# Patient Record
Sex: FEMALE | Race: WHITE
[De-identification: names, ages, dates, MRNs, and addresses within clinical notes are randomized per-mention and may not be internally consistent; named-entity substitution may affect disease eponyms.]

---

## 2018-10-13 ENCOUNTER — HOSPITAL ENCOUNTER (EMERGENCY)
Dept: HOSPITAL 47 - EC | Age: 28
Discharge: HOME | End: 2018-10-13
Payer: COMMERCIAL

## 2018-10-13 VITALS — SYSTOLIC BLOOD PRESSURE: 120 MMHG | DIASTOLIC BLOOD PRESSURE: 77 MMHG | TEMPERATURE: 98.4 F | RESPIRATION RATE: 22 BRPM

## 2018-10-13 VITALS — HEART RATE: 120 BPM

## 2018-10-13 DIAGNOSIS — F17.200: ICD-10-CM

## 2018-10-13 DIAGNOSIS — J98.01: ICD-10-CM

## 2018-10-13 DIAGNOSIS — J40: Primary | ICD-10-CM

## 2018-10-13 DIAGNOSIS — Z88.0: ICD-10-CM

## 2018-10-13 PROCEDURE — 71046 X-RAY EXAM CHEST 2 VIEWS: CPT

## 2018-10-13 PROCEDURE — 94640 AIRWAY INHALATION TREATMENT: CPT

## 2018-10-13 PROCEDURE — 96372 THER/PROPH/DIAG INJ SC/IM: CPT

## 2018-10-13 PROCEDURE — 99285 EMERGENCY DEPT VISIT HI MDM: CPT

## 2018-10-13 NOTE — ED
General Adult HPI





- General


Chief complaint: Upper Respiratory Infection


Stated complaint: Cough, SOB


Time Seen by Provider: 10/13/18 07:30


Source: patient, RN notes reviewed


Mode of arrival: ambulatory


Limitations: no limitations





- History of Present Illness


Initial comments: 





This is a 27-year-old female who is a smoker and presents to the emergency 

department with a two-week history of cough and some shortness of breath.  

Patient states got worse over the last 3 days now she has some positive sputum 

production.  Patient states that she has also the chills and felt that she's 

had a fever but has not taken her temperature.  Patient denies any chest pain 

or palpitations.  Patient denies any abdominal pain patient denies nausea 

vomiting diarrhea.  Patient denies any headache patient denies any numbness 

weakness.  Patient denies any dysuria hematuria urinary frequency.  Patient 

denies any calf pain or leg swelling.





- Related Data


 Previous Rx's











 Medication  Instructions  Recorded


 


HYDROcodone/APAP 5-325MG [Norco 1 tab PO Q6HR PRN #15 tab 11/20/16





5-325]  


 


Sulfamethox-Tmp 800-160Mg [Bactrim 1 tab PO Q12HR #20 tab 11/20/16





-160 mg]  


 


Albuterol Inhaler [Ventolin Hfa 1 - 2 puff INHALATION Q6HR PRN #2 10/13/18





Inhaler] puff 


 


Azithromycin [Zithromax Tri-Kris] 500 mg PO DAILY #3 tab 10/13/18


 


predniSONE 40 mg PO DAILY #8 tab 10/13/18











 Allergies











Allergy/AdvReac Type Severity Reaction Status Date / Time


 


Penicillins Allergy  Unknown Verified 10/13/18 07:34














Review of Systems


ROS Statement: 


Those systems with pertinent positive or pertinent negative responses have been 

documented in the HPI.





ROS Other: All systems not noted in ROS Statement are negative.





Past Medical History


Past Medical History: No Reported History


Additional Past Medical History / Comment(s): anemia


History of Any Multi-Drug Resistant Organisms: None Reported


Past Surgical History: Appendectomy, Orthopedic Surgery


Past Psychological History: No Psychological Hx Reported


Smoking Status: Current every day smoker


Past Alcohol Use History: Occasional


Past Drug Use History: None Reported





General Exam





- General Exam Comments


Initial Comments: 





GENERAL:


Patient is well-developed and well-nourished.  Patient is nontoxic and well-

hydrated and is in mild distress.





ENT:


Neck is soft and supple.  No significant lymphadenopathy is noted.  Oropharynx 

is clear.  Moist mucous membranes.  Neck has full range of motion without 

eliciting any pain.  





EYES:


The sclera were anicteric and conjunctiva were pink and moist.  Extraocular 

movements were intact and pupils were equal round and reactive to light.  

Eyelids were unremarkable.





PULMONARY:


Patient has some slight very wheezing.





CARDIOVASCULAR:


There is a regular rate and rhythm without any murmurs gallops or rubs. 





ABDOMEN:


Soft and nontender with normal bowel sounds.  No palpable organomegaly was 

noted.  There is no palpable pulsatile mass.





SKIN:


Skin is clear with no lesions or rashes and otherwise unremarkable.





NEUROLOGIC:


Patient is alert and oriented x3.  Cranial nerves II through XII are grossly 

intact.  Motor and sensory are also intact.  Normal speech, volume and content.

  Symmetrical smile.  





MUSCULOSKELETAL:


Normal extremities with adequate strength and full range of motion.  No lower 

extremity swelling or edema.  No calf tenderness.





LYMPHATICS:


No significant lymphadenopathy is noted





PSYCHIATRIC:


Normal psychiatric evaluation.  


Limitations: no limitations





Course


 Vital Signs











  10/13/18 10/13/18 10/13/18





  07:31 08:13 08:21


 


Temperature 98.4 F  


 


Pulse Rate 128 H 118 H 120 H


 


Respiratory 22  





Rate   


 


Blood Pressure 120/77  


 


O2 Sat by Pulse 91 L  





Oximetry   














Medical Decision Making





- Medical Decision Making





Treatment shows no acute abnormality.





Patient received a breathing treatment was doing considerably better after 

breathing..  Patient also received a shot of Rocephin.





Disposition


Clinical Impression: 


 Bronchitis with bronchospasm





Disposition: HOME SELF-CARE


Condition: Good


Instructions:  Acute Bronchitis (ED), Bronchospasm (ED)


Prescriptions: 


Albuterol Inhaler [Ventolin Hfa Inhaler] 1 - 2 puff INHALATION Q6HR PRN #2 puff


 PRN Reason: Difficulty breathing  


Azithromycin [Zithromax Tri-Kris] 500 mg PO DAILY #3 tab


predniSONE 40 mg PO DAILY #8 tab


Is patient prescribed a controlled substance at d/c from ED?: No


Referrals: 


None,Stated [Primary Care Provider] - 1-2 days


Time of Disposition: 09:09

## 2019-07-22 ENCOUNTER — HOSPITAL ENCOUNTER (EMERGENCY)
Dept: HOSPITAL 47 - EC | Age: 29
LOS: 1 days | Discharge: HOME | End: 2019-07-23
Payer: COMMERCIAL

## 2019-07-22 DIAGNOSIS — F17.200: ICD-10-CM

## 2019-07-22 DIAGNOSIS — Z88.0: ICD-10-CM

## 2019-07-22 DIAGNOSIS — Z87.828: ICD-10-CM

## 2019-07-22 DIAGNOSIS — M54.5: Primary | ICD-10-CM

## 2019-07-22 DIAGNOSIS — G89.29: ICD-10-CM

## 2019-07-22 PROCEDURE — 96372 THER/PROPH/DIAG INJ SC/IM: CPT

## 2019-07-22 PROCEDURE — 99283 EMERGENCY DEPT VISIT LOW MDM: CPT

## 2019-07-22 PROCEDURE — 87086 URINE CULTURE/COLONY COUNT: CPT

## 2019-07-22 PROCEDURE — 81001 URINALYSIS AUTO W/SCOPE: CPT

## 2019-07-23 VITALS
SYSTOLIC BLOOD PRESSURE: 145 MMHG | TEMPERATURE: 98.3 F | HEART RATE: 89 BPM | RESPIRATION RATE: 18 BRPM | DIASTOLIC BLOOD PRESSURE: 75 MMHG

## 2019-07-23 LAB
PH UR: 5.5 [PH] (ref 5–8)
RBC UR QL: 25 /HPF (ref 0–5)
SP GR UR: 1.02 (ref 1–1.03)
SQUAMOUS UR QL AUTO: 7 /HPF (ref 0–4)
UROBILINOGEN UR QL STRIP: 4 MG/DL (ref ?–2)

## 2019-07-23 NOTE — ED
Back Pain HPI





- General


Chief Complaint: Back Pain/Injury


Stated Complaint: Back Pain


Time Seen by Provider: 07/23/19 00:23


Source: patient


Limitations: no limitations





- History of Present Illness


Initial Comments: 


28-year-old female patient presents to the emergency department today for 

evaluation of low back pain.  Patient states that she had onset of this pain 

approximately one week ago.  Patient states that she has had this pain 

intermittently throughout the past after an injury 5 years ago.  She states that

the pain worsens with any type of position change.  States the pain is radiating

into the bilateral buttocks but no lower.  Denies any numbness or tingling to 

the extremities.  Denies any loss of bowel or bladder control or saddle 

anesthesia.  She denies any new injury to the back.  Denies any fever or chills.

 Denies abdominal pain.  Denies any hematuria, dysuria, urinary frequency, 

urinary urgency. Patient denies any recent rash, shortness breath, chest pain, 

nausea, vomiting, diarrhea, constipation, dizziness, weakness, headache, visual 

changes, or any other complaints.








- Related Data


                                  Previous Rx's











 Medication  Instructions  Recorded


 


HYDROcodone/APAP 5-325MG [Norco 1 tab PO Q6HR PRN #15 tab 11/20/16





5-325]  


 


Sulfamethox-Tmp 800-160Mg [Bactrim 1 tab PO Q12HR #20 tab 11/20/16





-160 mg]  


 


Albuterol Inhaler [Ventolin Hfa 1 - 2 puff INHALATION Q6HR PRN #2 10/13/18





Inhaler] puff 


 


Azithromycin [Zithromax Tri-Kris] 500 mg PO DAILY #3 tab 10/13/18


 


predniSONE 40 mg PO DAILY #8 tab 10/13/18


 


Diazepam [Valium] 5 mg PO TID PRN 3 Days #9 tab 07/23/19


 


Lidocaine 5% Patch [Lidoderm] 1 patch TOPICAL DAILY #30 patch 07/23/19











                                    Allergies











Allergy/AdvReac Type Severity Reaction Status Date / Time


 


Penicillins Allergy  Unknown Verified 07/22/19 23:24














Review of Systems


ROS Statement: 


Those systems with pertinent positive or pertinent negative responses have been 

documented in the HPI.





ROS Other: All systems not noted in ROS Statement are negative.





Past Medical History


Past Medical History: No Reported History


Additional Past Medical History / Comment(s): anemia, back problems,


History of Any Multi-Drug Resistant Organisms: None Reported


Past Surgical History: Appendectomy, Orthopedic Surgery


Additional Past Surgical History / Comment(s): right ribs


Past Psychological History: No Psychological Hx Reported


Smoking Status: Current every day smoker


Past Alcohol Use History: Occasional


Past Drug Use History: None Reported





General Exam


Limitations: no limitations


General appearance: alert, in no apparent distress, other (Physical well-

developed, well-nourished adult female patient in no acute distress.  Vital 

signs upon presentation are temperature 98.8F, pulse 108, respirations 16, 

blood pressure 126/83, pulse ox 95% on room air.)


Eye exam: Present: normal appearance, PERRL, EOMI.  Absent: scleral icterus, 

conjunctival injection, periorbital swelling


ENT exam: Present: normal exam, normal oropharynx, mucous membranes moist


Respiratory exam: Present: normal lung sounds bilaterally.  Absent: respiratory 

distress, wheezes, rales, rhonchi, stridor


Cardiovascular Exam: Present: regular rate, normal rhythm, normal heart sounds. 

 Absent: systolic murmur, diastolic murmur, rubs, gallop, clicks


GI/Abdominal exam: Present: soft, normal bowel sounds.  Absent: distended, 

tenderness, guarding, rebound, rigid


Back exam: Present: normal inspection, tenderness (Tetanus the bilateral lower 

back).  Absent: vertebral tenderness


Neurological exam: Present: alert, oriented X3, CN II-XII intact, other (Strengt

h in all 4 extremities is 5/5.)


Psychiatric exam: Present: normal affect, normal mood


Skin exam: Present: warm, dry, intact, normal color.  Absent: rash





Course


                                   Vital Signs











  07/22/19 07/23/19





  23:17 01:32


 


Temperature 98.8 F 98.3 F


 


Pulse Rate 108 H 89


 


Respiratory 16 18





Rate  


 


Blood Pressure 126/83 145/75


 


O2 Sat by Pulse 95 99





Oximetry  














Medical Decision Making





- Medical Decision Making


28-year-old female patient presented to the emergency department today for 

evaluation of low back pain.  Patient states she has had this pain 

intermittently over the last 5 years after sustaining an injury to the back.  

Physical examination is unremarkable.  She is afebrile with normal vitals.  

There is no spinal tenderness.  Inspection of the back is normal.  She is 

neurologically intact with no focal deficits.  She has no concerning symptoms 

for cauda equina.  With no injury will not perform any imaging today.  We will 

treat for acute on chronic low back pain with muscle relaxers, anti-inflammatory

 pain medication.  She is instructed to follow-up with her primary care 

physician for recheck in 1-2 days.  Return parameters were discussed in detail. 

 She verbalizes understanding and agrees this plan.








- Lab Data


                                   Lab Results











  07/22/19 Range/Units





  23:34 


 


Urine Color  Yellow  


 


Urine Appearance  Cloudy H  (Clear)  


 


Urine pH  5.5  (5.0-8.0)  


 


Ur Specific Gravity  1.025  (1.001-1.035)  


 


Urine Protein  Trace H  (Negative)  


 


Urine Glucose (UA)  Negative  (Negative)  


 


Urine Ketones  Negative  (Negative)  


 


Urine Blood  Negative  (Negative)  


 


Urine Nitrite  Negative  (Negative)  


 


Urine Bilirubin  Negative  (Negative)  


 


Urine Urobilinogen  4.0  (<2.0)  mg/dL


 


Ur Leukocyte Esterase  Large H  (Negative)  


 


Urine RBC  25 H  (0-5)  /hpf


 


Ur Squamous Epith Cells  7 H  (0-4)  /hpf


 


Urine Bacteria  Rare H  (None)  /hpf


 


Urine Mucus  Occasional H  (None)  /hpf














Disposition


Clinical Impression: 


 Mechanical low back pain





Disposition: HOME SELF-CARE


Condition: Good


Instructions (If sedation given, give patient instructions):  Acute Low Back 

Pain (ED)


Additional Instructions: 


Take medications as directed.  Apply warm moist heat to the low back 20 minutes 

at a time at least 4 times daily.  Follow-up with the back specialist for 

further evaluation.  Return to the emergency department immediately for any new,

 worsening, or concerning symptoms.


Prescriptions: 


Lidocaine 5% Patch [Lidoderm] 1 patch TOPICAL DAILY #30 patch


Diazepam [Valium] 5 mg PO TID PRN 3 Days #9 tab


 PRN Reason: Muscle Spasm


Is patient prescribed a controlled substance at d/c from ED?: No


Referrals: 


ZARI Mccray DO [Doctor of Osteopathic Medicine] - 1-2 days


Time of Disposition: 00:53

## 2023-04-15 ENCOUNTER — HOSPITAL ENCOUNTER (EMERGENCY)
Dept: HOSPITAL 47 - EC | Age: 33
Discharge: HOME | End: 2023-04-15
Payer: COMMERCIAL

## 2023-04-15 VITALS — TEMPERATURE: 98.8 F | RESPIRATION RATE: 18 BRPM

## 2023-04-15 VITALS — HEART RATE: 61 BPM | DIASTOLIC BLOOD PRESSURE: 81 MMHG | SYSTOLIC BLOOD PRESSURE: 125 MMHG

## 2023-04-15 DIAGNOSIS — M62.830: Primary | ICD-10-CM

## 2023-04-15 DIAGNOSIS — Z88.0: ICD-10-CM

## 2023-04-15 DIAGNOSIS — F17.200: ICD-10-CM

## 2023-04-15 PROCEDURE — 99283 EMERGENCY DEPT VISIT LOW MDM: CPT

## 2023-04-15 PROCEDURE — 96372 THER/PROPH/DIAG INJ SC/IM: CPT

## 2023-04-15 NOTE — ED
General Adult HPI





- General


Chief complaint: Back Pain/Injury


Stated complaint: Back Pain


Time Seen by Provider: 04/15/23 04:07


Source: patient


Mode of arrival: ambulatory


Limitations: no limitations





- History of Present Illness


Initial comments: 


This is a 32-year-old female with no past medical history presents emergency 

department for low back muscle spasms.  The patient stated she woke up today not

being able to move secondary to spasms in her left lower back.  The patient 

stated this is happened similar times in the past at which time she needed to be

assisted to move due to spasms.  The patient did state that she did yard work 

yesterday but did not have any acute pain at that time.  The patient did state 

that the pain became worse overnight to the point where she could not move.  The

patient denied any numbness or tingling in the lower legs and denied any urinary

or fecal incontinence.  The patient stated that the pain was focused on the left

lower back between the ASIS and midline spine.  The patient denied any other 

acute pain or complaints at this time and denied any acute trauma.








- Related Data


                                  Previous Rx's











 Medication  Instructions  Recorded


 


HYDROcodone/APAP 5-325MG [Norco 1 tab PO Q6HR PRN #15 tab 11/20/16





5-325]  


 


Sulfamethox-Tmp 800-160Mg [Bactrim 1 tab PO Q12HR #20 tab 11/20/16





-160 mg]  


 


Albuterol Inhaler [Ventolin Hfa 1 - 2 puff INHALATION Q6HR PRN #2 10/13/18





Inhaler] puff 


 


Azithromycin [Zithromax Tri-Kris] 500 mg PO DAILY #3 tab 10/13/18


 


predniSONE [Deltasone] 40 mg PO DAILY #8 tab 10/13/18


 


Lidocaine 5% Patch [Lidoderm] 1 patch TOPICAL DAILY #30 patch 07/23/19


 


diazePAM [Valium] 5 mg PO TID PRN 3 Days #9 tab 07/23/19


 


Ibuprofen [Motrin] 800 mg PO Q8H #30 tab 04/15/23


 


methocarbamoL [Robaxin-750] 750 mg PO TID #30 tab 04/15/23











                                    Allergies











Allergy/AdvReac Type Severity Reaction Status Date / Time


 


Penicillins Allergy  Unknown Verified 04/15/23 03:25














Review of Systems


ROS Statement: 


Those systems with pertinent positive or pertinent negative responses have been 

documented in the HPI.





ROS Other: All systems not noted in ROS Statement are negative.





Past Medical History


Past Medical History: No Reported History


Additional Past Medical History / Comment(s): anemia, back problems,


History of Any Multi-Drug Resistant Organisms: None Reported


Past Surgical History: Appendectomy, Orthopedic Surgery


Additional Past Surgical History / Comment(s): right ribs


Past Psychological History: No Psychological Hx Reported


Smoking Status: Current every day smoker


Past Alcohol Use History: Rare


Past Drug Use History: None Reported





General Exam


Limitations: no limitations


General appearance: alert, in no apparent distress


Head exam: Present: atraumatic, normocephalic, normal inspection


Eye exam: Present: normal appearance, PERRL


Pupils: Present: normal accommodation


ENT exam: Present: normal exam, normal oropharynx, mucous membranes moist


Neck exam: Present: normal inspection, full ROM


Respiratory exam: Present: normal lung sounds bilaterally


Cardiovascular Exam: Present: regular rate, normal rhythm, normal heart sounds


GI/Abdominal exam: Present: soft, normal bowel sounds


Extremities exam: Present: normal inspection, full ROM


Back exam: Present: normal inspection, full ROM, tenderness (Reproducible 

tenderness to palpation over the lower left back between the ASIS and midline 

spine.)


Neurological exam: Present: alert, oriented X3, CN II-XII intact


Psychiatric exam: Present: normal affect, normal mood


Skin exam: Present: warm, dry





Course


                                   Vital Signs











  04/15/23 04/15/23





  03:25 06:00


 


Temperature 98.8 F 


 


Pulse Rate 85 61


 


Respiratory 18 18





Rate  


 


Blood Pressure 125/84 125/81


 


O2 Sat by Pulse 100 97





Oximetry  














Medical Decision Making





- Medical Decision Making


Was pt. sent in by a medical professional or institution (Dr. PA, NP, urgent 

care, hospital, or nursing home...) When possible be specific


@  -No


Did you speak to anyone other than the patient for history (EMS, parent, family,

police, friend...)? What history was obtained from this source 


@  -No


Did you review nursing and triage notes (agree or disagree)?  Why? 


@  -I reviewed and agree with nursing and triage notes


Were old charts reviewed (outside hosp., previous admission, EMS record, old 

EKG, old radiological studies, urgent care reports/EKG's, nursing home records)?

Report findings 


@  -No old charts were reviewed


Differential Diagnosis (chest pain, altered mental status, abdominal pain women,

abdominal pain men, vaginal bleeding, weakness, fever, dyspnea, syncope, 

headache, dizziness, GI bleed, back pain, seizure, CVA, palpatations, mental 

health)? 


@  -Acute muscle strain, muscle spasm, muscle contusion


EKG interpreted by me (3pts min.).


@  -None


X-rays interpreted by me (1pt min.).


@  -None done


CT interpreted by me (1pt min.).


@  -None done


U/S interpreted by me (1pt. min.).


@  -None done


What testing was considered but not performed or refused? (CT, X-rays, U/S, 

labs)? Why?


@  -None


What meds were considered but not given or refused? Why?


@  -None


Did you discuss the management of the patient with other professionals 

(professionals i.e. , PA, NP, lab, RT, psych nurse, , , 

teacher, , )? Give summary


@  -No


Was smoking cessation discussed for >3mins.?


@  -Yes


Was critical care preformed (if so, how long)?


@  -No


Were there social determinants of health that impacted care today? How? 

(Homelessness, low income, unemployed, alcoholism, drug addiction, 

transportation, low edu. Level, literacy, decrease access to med. care, [facility], 

rehab)?


@  -No


Was there de-escalation of care discussed even if they declined (Discuss DNR or 

withdrawal of care, Hospice)? DNR status


@  -No


What co-morbidities impacted this encounter? (DM, HTN, Smoking, COPD, CAD, 

Cancer, CVA, ARF, Chemo, Hep., AIDS, mental health diagnosis, sleep apnea, 

morbid obesity)?


@  -None


Was patient admitted / discharged? Hospital course, mention meds given and 

route, prescriptions, significant lab abnormalities, going to OR and other 

pertinent info.


@  -The patient was seen and evaluated emergency department.  Physical exam, the

patient was resting in bed without any acute distress.  The patient did have 

tenderness on palpation that was reproducible to the left lower back consistent 

with a spasm versus strain.  The patient was given IM Norflex and Toradol and on

reevaluation stated that her symptoms had improved.  The patient was deemed 

stable for discharge and was given a prescription for Robaxin and Motrin to be 

taken at home.  The patient was advised to continue to monitor symptoms and to 

report back to the emergency department if they became acutely worse.  The 

patient was agreeable to this and all of her questions were answered.  The 

patient was discharged home in stable condition with her .


Undiagnosed new problem with uncertain prognosis?


@  -No


Drug Therapy requiring intensive monitoring for toxicity (Heparin, Nitro, 

Insulin, Cardizem)?


@  -No


Were any procedures done?


@  -No


Diagnosis/symptom?


@  -Low back muscle strain versus spasm


Acute, or Chronic, or Acute on Chronic?


@  -Acute


Uncomplicated (without systemic symptoms) or Complicated (systemic symptoms)?


@  -Uncomplicated


Side effects of treatment?


@  -No


Exacerbation, Progression, or Severe Exacerbation?


@  -No


Poses a threat to life or bodily function? How? (Chest pain, USA, MI, pneumonia,

PE, COPD, DKA, ARF, appy, cholecystitis, CVA, Diverticulitis, Homicidal, 

Suicidal, threat to staff... and all critical care pts)


@  -No








Disposition


Clinical Impression: 


 Back muscle spasm





Disposition: HOME SELF-CARE


Condition: Stable


Instructions (If sedation given, give patient instructions):  Acute Low Back 

Pain (ED), Muscle Spasm (ED)


Prescriptions: 


Ibuprofen [Motrin] 800 mg PO Q8H #30 tab


methocarbamoL [Robaxin-750] 750 mg PO TID #30 tab


Is patient prescribed a controlled substance at d/c from ED?: No


Referrals: 


None,Stated [Primary Care Provider] - 1-2 days


Time of Disposition: 06:00